# Patient Record
Sex: FEMALE | Race: WHITE | NOT HISPANIC OR LATINO | Employment: OTHER | URBAN - METROPOLITAN AREA
[De-identification: names, ages, dates, MRNs, and addresses within clinical notes are randomized per-mention and may not be internally consistent; named-entity substitution may affect disease eponyms.]

---

## 2023-02-08 ENCOUNTER — TELEPHONE (OUTPATIENT)
Dept: FAMILY MEDICINE | Facility: CLINIC | Age: 58
End: 2023-02-08
Payer: OTHER GOVERNMENT

## 2023-02-08 NOTE — TELEPHONE ENCOUNTER
----- Message from Brian Dominguez sent at 2/8/2023 11:28 AM CST -----  Contact: self  Type: RX Refill Request        Who Called: patient   Refill or New Rx: refill  RX Name and Strength: Pt forgot medication name.  Best Call Back Number: [phone]  Additional Information: Pt is new to [facility] states she is in pain from her medicine she needs refilled cant go to her old doctor anymore due to insurance change and now wants to become a new pt for Dr. Tavera. Pt needs medication refilled for pain she is in. Plz call to get scheduled. Pt states she wants to receive  paper scripts so she can refill the medication herself if Dr. Tavera does that or not. Thanks

## 2023-02-10 ENCOUNTER — OFFICE VISIT (OUTPATIENT)
Dept: FAMILY MEDICINE | Facility: CLINIC | Age: 58
End: 2023-02-10
Payer: OTHER GOVERNMENT

## 2023-02-10 VITALS
WEIGHT: 225.06 LBS | OXYGEN SATURATION: 98 % | BODY MASS INDEX: 33.34 KG/M2 | SYSTOLIC BLOOD PRESSURE: 160 MMHG | HEIGHT: 69 IN | HEART RATE: 82 BPM | RESPIRATION RATE: 18 BRPM | DIASTOLIC BLOOD PRESSURE: 98 MMHG

## 2023-02-10 DIAGNOSIS — Z01.89 ENCOUNTER FOR LABORATORY TEST: ICD-10-CM

## 2023-02-10 DIAGNOSIS — M79.674 PAIN OF TOE OF RIGHT FOOT: ICD-10-CM

## 2023-02-10 DIAGNOSIS — M10.9 ACUTE GOUT OF RIGHT FOOT, UNSPECIFIED CAUSE: Primary | ICD-10-CM

## 2023-02-10 DIAGNOSIS — Z76.89 ENCOUNTER TO ESTABLISH CARE WITH NEW DOCTOR: ICD-10-CM

## 2023-02-10 DIAGNOSIS — I10 UNCONTROLLED HYPERTENSION: ICD-10-CM

## 2023-02-10 DIAGNOSIS — M79.671 ARCH PAIN OF RIGHT FOOT: ICD-10-CM

## 2023-02-10 DIAGNOSIS — M79.671 RIGHT FOOT PAIN: ICD-10-CM

## 2023-02-10 DIAGNOSIS — E66.9 CLASS 1 OBESITY WITH BODY MASS INDEX (BMI) OF 33.0 TO 33.9 IN ADULT, UNSPECIFIED OBESITY TYPE, UNSPECIFIED WHETHER SERIOUS COMORBIDITY PRESENT: ICD-10-CM

## 2023-02-10 DIAGNOSIS — L84 FOOT CALLUS: ICD-10-CM

## 2023-02-10 DIAGNOSIS — M65.9 TENOSYNOVITIS OF RIGHT FOOT: ICD-10-CM

## 2023-02-10 PROCEDURE — 99204 PR OFFICE/OUTPT VISIT, NEW, LEVL IV, 45-59 MIN: ICD-10-PCS | Mod: S$PBB,,, | Performed by: INTERNAL MEDICINE

## 2023-02-10 PROCEDURE — 99214 OFFICE O/P EST MOD 30 MIN: CPT | Mod: PBBFAC,PN | Performed by: INTERNAL MEDICINE

## 2023-02-10 PROCEDURE — 99999 PR PBB SHADOW E&M-EST. PATIENT-LVL IV: CPT | Mod: PBBFAC,,, | Performed by: INTERNAL MEDICINE

## 2023-02-10 PROCEDURE — 99204 OFFICE O/P NEW MOD 45 MIN: CPT | Mod: S$PBB,,, | Performed by: INTERNAL MEDICINE

## 2023-02-10 PROCEDURE — 99999 PR PBB SHADOW E&M-EST. PATIENT-LVL IV: ICD-10-PCS | Mod: PBBFAC,,, | Performed by: INTERNAL MEDICINE

## 2023-02-10 RX ORDER — AMLODIPINE BESYLATE 5 MG/1
5 TABLET ORAL DAILY
Qty: 30 TABLET | Refills: 2 | Status: SHIPPED | OUTPATIENT
Start: 2023-02-10 | End: 2023-05-16

## 2023-02-10 RX ORDER — COLCHICINE 0.6 MG/1
TABLET ORAL
Qty: 30 TABLET | Refills: 2 | Status: SHIPPED | OUTPATIENT
Start: 2023-02-10 | End: 2023-02-24

## 2023-02-10 RX ORDER — AMLODIPINE BESYLATE 5 MG/1
5 TABLET ORAL DAILY
Qty: 30 TABLET | Refills: 2 | OUTPATIENT
Start: 2023-02-10 | End: 2023-02-10 | Stop reason: SDUPTHER

## 2023-02-10 RX ORDER — COLCHICINE 0.6 MG/1
TABLET ORAL
Qty: 30 TABLET | Refills: 2 | OUTPATIENT
Start: 2023-02-10 | End: 2023-02-10 | Stop reason: SDUPTHER

## 2023-02-10 NOTE — PROGRESS NOTES
Subjective:       Patient ID: Roxanne Aquino is a 57 y.o. female.      Patient here today to establish with me as her new PCP at [facility]. Past medical history and surgical history delineated and noted. Social medical history and family medical history also delineated and noted.  Review of systems obtained at length prior to physical exam being performed.  Medications reviewed as well and addressed.  Labs reviewed and ordered for follow-up as needed.      Chief Complaint: Establish Care and Foot Pain (Right foot, swollen, pt reports having a history of gout, here for medication)  HPI here to establish care with me as her new PCP at [facility].; would like me also manage acute gout flare up involving her right foot.  Patient has no prior history of hypertension noon and has been out of her [employer] a gene.  Patient works on her feet as a .  Foot Pain  Pertinent negatives include no abdominal pain, arthralgias, chest pain, congestion, coughing, fever, myalgias, nausea, rash or vomiting.   Acute gout of right foot, unspecified cause; needs to stay off her right leg and keep it elevated; Cold pack applications. Start colchicine asap. Follow BP at home and keep a log.  Cold packed applications to her right foot and try and keep it elevated at home and off her right foot as much as possible till resolved  -     colchicine (COLCRYS) 0.6 mg tablet; Take 1.2 mg (two tabs) at start of attack then may repeat 0.6 mg in 1 hr x1 dose if needed. Max 3 tabs per attack. 12 hrs later can begin 0.6 mg po daily. As prophylaxis. generic  Dispense: 30 tablet; Refill: 2  -     Sedimentation rate; Future; Expected date: 02/10/2023  -     Rheumatoid Factor; Future; Expected date: 02/10/2023  -     JOHN Screen w/Reflex; Future; Expected date: 02/10/2023  -     URIC ACID; Future; Expected date: 02/10/2023    Tenosynovitis of right foot; obtain padded sole inserts for foot. Tylenol arthritis for pain  as well.  Looser fitting shoes would also help  -     colchicine (COLCRYS) 0.6 mg tablet; Take 1.2 mg (two tabs) at start of attack then may repeat 0.6 mg in 1 hr x1 dose if needed. Max 3 tabs per attack. 12 hrs later can begin 0.6 mg po daily. As prophylaxis. generic  Dispense: 30 tablet; Refill: 2  -     Sedimentation rate; Future; Expected date: 02/10/2023  -     Rheumatoid Factor; Future; Expected date: 02/10/2023  -     JOHN Screen w/Reflex; Future; Expected date: 02/10/2023  -     URIC ACID; Future; Expected date: 02/10/2023    Arch pain of right foot; appears to have resolved.   -     colchicine (COLCRYS) 0.6 mg tablet; Take 1.2 mg (two tabs) at start of attack then may repeat 0.6 mg in 1 hr x1 dose if needed. Max 3 tabs per attack. 12 hrs later can begin 0.6 mg po daily. As prophylaxis. generic  Dispense: 30 tablet; Refill: 2  -     Sedimentation rate; Future; Expected date: 02/10/2023  -     Rheumatoid Factor; Future; Expected date: 02/10/2023  -     JOHN Screen w/Reflex; Future; Expected date: 02/10/2023  -     URIC ACID; Future; Expected date: 02/10/2023    Right foot pain; tylenol arthritis for pain relief.   -     colchicine (COLCRYS) 0.6 mg tablet; Take 1.2 mg (two tabs) at start of attack then may repeat 0.6 mg in 1 hr x1 dose if needed. Max 3 tabs per attack. 12 hrs later can begin 0.6 mg po daily. As prophylaxis. generic  Dispense: 30 tablet; Refill: 2  -     Sedimentation rate; Future; Expected date: 02/10/2023  -     Rheumatoid Factor; Future; Expected date: 02/10/2023  -     JOHN Screen w/Reflex; Future; Expected date: 02/10/2023  -     URIC ACID; Future; Expected date: 02/10/2023    Pain of toe of right foot; mostly 1st toe at MTP and bunion.   -     colchicine (COLCRYS) 0.6 mg tablet; Take 1.2 mg (two tabs) at start of attack then may repeat 0.6 mg in 1 hr x1 dose if needed. Max 3 tabs per attack. 12 hrs later can begin 0.6 mg po daily. As prophylaxis. generic  Dispense: 30 tablet; Refill: 2  -      Sedimentation rate; Future; Expected date: 02/10/2023  -     Rheumatoid Factor; Future; Expected date: 02/10/2023  -     JOHN Screen w/Reflex; Future; Expected date: 02/10/2023  -     URIC ACID; Future; Expected date: 02/10/2023    Right foot callus:  Use Aquaphor gel topical application 1-2 times daily as needed    Uncontrolled hypertension; Maintain < 1.5-2 Gm Na a day diet, and monitor BP at home; keep a log. Start amlodipine 5 mg a day as soon as possible. No caffeine. Drinks coffee all day; wean to decaff.   -     amLODIPine (NORVASC) 5 MG tablet; Take 1 tablet (5 mg total) by mouth once daily.  Dispense: 30 tablet; Refill: 2  -     Comprehensive Metabolic Panel; Future; Expected date: 02/10/2023  -     CBC Auto Differential; Future; Expected date: 02/10/2023  -     Lipid Panel; Future; Expected date: 02/10/2023  -     T4, Free; Future; Expected date: 02/10/2023  -     TSH; Future; Expected date: 02/10/2023    Obesity; Caloric restriction w regular exercise and weight reduction.  BMI 33.24    Encounter for lab test: Labs reviewed and discussed with patient and ordered for follow-up    Encounter to establish care with new doctor    Total time 1:45 p.m. through 2:32 p.m..  Greater than 50% of the time spent in discussion, counseling, and review.  Medications reviewed and addressed and prescribed labs reviewed and discussed with patient and ordered for follow-up.  Various different topics discussed including plan of care; discussed importance of obtaining a nursing visit for blood pressure assessment in 7-10 days.  Also monitor her blood pressure at home and keep a log for office review.  Patient to sit in chair during the morning with feet flat on the ground for good 5 minutes before running her blood pressure cuff.      Vitals:    02/10/23 1238 02/10/23 1258   BP: (!) 164/108 (!) 160/98   BP Location: Left arm    Patient Position: Sitting    Pulse: 82    Resp: 18    SpO2: 98%    Weight: 102.1 kg (225 lb 1.4 oz)   "  Height: 5' 9" (1.753 m)        BMI Readings from Last 3 Encounters:   02/10/23 33.24 kg/m²        Wt Readings from Last 3 Encounters:   02/10/23 1238 102.1 kg (225 lb 1.4 oz)        BP Readings from Last 3 Encounters:   02/10/23 (!) 160/98        There are no preventive care reminders to display for this patient.     Health Maintenance Due   Topic Date Due    Hepatitis C Screening  Never done    Cervical Cancer Screening  Never done    Lipid Panel  Never done    COVID-19 Vaccine (1) Never done    HIV Screening  Never done    TETANUS VACCINE  Never done    Mammogram  Never done    Hemoglobin A1c (Diabetic Prevention Screening)  Never done    Colorectal Cancer Screening  Never done    Shingles Vaccine (1 of 2) Never done    Influenza Vaccine (1) Never done         No past medical history on file.    No past surgical history on file.    Social History     Tobacco Use    Smoking status: Former     Types: Cigarettes     Quit date:      Years since quittin.1    Smokeless tobacco: Never       No family history on file.    Review of patient's allergies indicates:  No Known Allergies    No current outpatient medications on file prior to visit.     No current facility-administered medications on file prior to visit.     Review of Systems   Constitutional:  Negative for appetite change and fever.   HENT:  Negative for congestion, postnasal drip, rhinorrhea and sinus pressure.    Eyes:  Negative for discharge and itching.   Respiratory:  Negative for cough, chest tightness, shortness of breath and wheezing.    Cardiovascular:  Negative for chest pain, palpitations and leg swelling.   Gastrointestinal:  Negative for abdominal distention, abdominal pain, blood in stool, constipation, diarrhea, nausea and vomiting.   Endocrine: Negative for polydipsia, polyphagia and polyuria.   Genitourinary:  Negative for dysuria and hematuria.   Musculoskeletal:  Negative for arthralgias and myalgias.        Right foot pain for 1 " "week   Skin:  Negative for rash.   Allergic/Immunologic: Negative for environmental allergies and food allergies.   Neurological:  Negative for tremors, seizures and syncope.   Hematological:  Negative for adenopathy. Does not bruise/bleed easily.   Psychiatric/Behavioral:  Negative for dysphoric mood. The patient is not nervous/anxious.      Objective:      Vitals:    02/10/23 1238 02/10/23 1258   BP: (!) 164/108 (!) 160/98   BP Location: Left arm    Patient Position: Sitting    Pulse: 82    Resp: 18    SpO2: 98%    Weight: 102.1 kg (225 lb 1.4 oz)    Height: 5' 9" (1.753 m)      Body mass index is 33.24 kg/m².    Physical Exam  Vitals reviewed.   Constitutional:       Appearance: She is well-developed.   HENT:      Head: Normocephalic and atraumatic.   Neck:      Thyroid: No thyromegaly.      Vascular: No carotid bruit.   Cardiovascular:      Rate and Rhythm: Normal rate and regular rhythm.      Heart sounds: Normal heart sounds. No murmur heard.    No gallop.   Pulmonary:      Effort: Pulmonary effort is normal. No respiratory distress.      Breath sounds: Normal breath sounds. No wheezing or rales.   Abdominal:      General: Bowel sounds are normal. There is no distension.      Palpations: Abdomen is soft.      Tenderness: There is no abdominal tenderness. There is no guarding or rebound.   Musculoskeletal:         General: Normal range of motion.      Cervical back: Normal range of motion and neck supple.      Right lower leg: Edema present.      Left lower leg: No edema.      Comments: Right foot; early bunion tender to palp; medial 1st MT inflamed and 1st MTP and tender; a lot less tender to other MTPs;; ant ankle area inflamed w edema; edema also to dorsum of foot;, decrease 1st toe ROM. Heel callus and 1st plantar MTP as well. Edema of right ankle and foot. No calf tenderness to palp.    Lymphadenopathy:      Cervical: No cervical adenopathy.   Skin:     Findings: No rash.   Neurological:      Mental " Status: She is alert and oriented to person, place, and time.      Comments: Moves all 4 extremities fine.   Psychiatric:         Behavior: Behavior normal.         Thought Content: Thought content normal.       Assessment:       1. Acute gout of right foot, unspecified cause    2. Tenosynovitis of right foot    3. Arch pain of right foot    4. Right foot pain    5. Pain of toe of right foot    6. Foot callus    7. Uncontrolled hypertension    8. Class 1 obesity with body mass index (BMI) of 33.0 to 33.9 in adult, unspecified obesity type, unspecified whether serious comorbidity present    9. Encounter for laboratory test    10. Encounter to establish care with new doctor        Plan:       Acute gout of right foot, unspecified cause; needs to stay off her right leg and keep it elevated; Cold pack applications. Start colchicine asap. Follow BP at home and keep a log..  Elevate right leg at home and try and stay off her foot as much as possible till resolved.  Cold pack applications also can help; avoiding steroids due to uncontrolled hypertension and present with 160/98.  Blood pressure by me 199/90 patient asymptomatic without any neuro symptoms    -     colchicine (COLCRYS) 0.6 mg tablet; Take 1.2 mg (two tabs) at start of attack then may repeat 0.6 mg in 1 hr x1 dose if needed. Max 3 tabs per attack. 12 hrs later can begin 0.6 mg po daily. As prophylaxis. generic  Dispense: 30 tablet; Refill: 2  -     Sedimentation rate; Future; Expected date: 02/10/2023  -     Rheumatoid Factor; Future; Expected date: 02/10/2023  -     JOHN Screen w/Reflex; Future; Expected date: 02/10/2023  -     URIC ACID; Future; Expected date: 02/10/2023    Tenosynovitis of right foot; obtain padded sole inserts for foot. Tylenol arthritis for pain as well.  Looser fitting shoes would also help  -     colchicine (COLCRYS) 0.6 mg tablet; Take 1.2 mg (two tabs) at start of attack then may repeat 0.6 mg in 1 hr x1 dose if needed. Max 3 tabs per  attack. 12 hrs later can begin 0.6 mg po daily. As prophylaxis. generic  Dispense: 30 tablet; Refill: 2  -     Sedimentation rate; Future; Expected date: 02/10/2023  -     Rheumatoid Factor; Future; Expected date: 02/10/2023  -     JOHN Screen w/Reflex; Future; Expected date: 02/10/2023  -     URIC ACID; Future; Expected date: 02/10/2023    Arch pain of right foot; appears to have resolved.   -     colchicine (COLCRYS) 0.6 mg tablet; Take 1.2 mg (two tabs) at start of attack then may repeat 0.6 mg in 1 hr x1 dose if needed. Max 3 tabs per attack. 12 hrs later can begin 0.6 mg po daily. As prophylaxis. generic  Dispense: 30 tablet; Refill: 2  -     Sedimentation rate; Future; Expected date: 02/10/2023  -     Rheumatoid Factor; Future; Expected date: 02/10/2023  -     JOHN Screen w/Reflex; Future; Expected date: 02/10/2023  -     URIC ACID; Future; Expected date: 02/10/2023    Right foot pain; tylenol arthritis for pain relief.   -     colchicine (COLCRYS) 0.6 mg tablet; Take 1.2 mg (two tabs) at start of attack then may repeat 0.6 mg in 1 hr x1 dose if needed. Max 3 tabs per attack. 12 hrs later can begin 0.6 mg po daily. As prophylaxis. generic  Dispense: 30 tablet; Refill: 2  -     Sedimentation rate; Future; Expected date: 02/10/2023  -     Rheumatoid Factor; Future; Expected date: 02/10/2023  -     JOHN Screen w/Reflex; Future; Expected date: 02/10/2023  -     URIC ACID; Future; Expected date: 02/10/2023    Pain of toe of right foot; mostly 1st toe at MTP and bunion.   -     colchicine (COLCRYS) 0.6 mg tablet; Take 1.2 mg (two tabs) at start of attack then may repeat 0.6 mg in 1 hr x1 dose if needed. Max 3 tabs per attack. 12 hrs later can begin 0.6 mg po daily. As prophylaxis. generic  Dispense: 30 tablet; Refill: 2  -     Sedimentation rate; Future; Expected date: 02/10/2023  -     Rheumatoid Factor; Future; Expected date: 02/10/2023  -     JOHN Screen w/Reflex; Future; Expected date: 02/10/2023  -     URIC ACID;  Future; Expected date: 02/10/2023    Right foot callus:  Use Aquaphor gel topical application 1-2 times daily as needed    Uncontrolled hypertension; Maintain < 1.5-2 Gm Na a day diet, and monitor BP at home; keep a log. Start amlodipine 5 mg a day as soon as possible. No caffeine. Drinks coffee all day; wean to decaff.   -     amLODIPine (NORVASC) 5 MG tablet; Take 1 tablet (5 mg total) by mouth once daily.  Dispense: 30 tablet; Refill: 2  -     Comprehensive Metabolic Panel; Future; Expected date: 02/10/2023  -     CBC Auto Differential; Future; Expected date: 02/10/2023  -     Lipid Panel; Future; Expected date: 02/10/2023  -     T4, Free; Future; Expected date: 02/10/2023  -     TSH; Future; Expected date: 02/10/2023    Obesity; Caloric restriction w regular exercise and weight reduction.     Encounter for lab test: Labs reviewed and discussed with patient and ordered for follow-up    Encounter to establish care with new doctor

## 2023-02-10 NOTE — PATIENT INSTRUCTIONS
Acute gout of right foot, unspecified cause; needs to stay off her right leg and keep it elevated; Cold pack applications. Start colchicine asap. Follow BP at home and keep a log.   -     colchicine (COLCRYS) 0.6 mg tablet; Take 1.2 mg (two tabs) at start of attack then may repeat 0.6 mg in 1 hr x1 dose if needed. Max 3 tabs per attack. 12 hrs later can begin 0.6 mg po daily. As prophylaxis. generic  Dispense: 30 tablet; Refill: 2  -     Sedimentation rate; Future; Expected date: 02/10/2023  -     Rheumatoid Factor; Future; Expected date: 02/10/2023  -     JOHN Screen w/Reflex; Future; Expected date: 02/10/2023  -     URIC ACID; Future; Expected date: 02/10/2023    Tenosynovitis of right foot; obtain padded sole inserts for foot. Tylenol arthritis for pain as well.   -     colchicine (COLCRYS) 0.6 mg tablet; Take 1.2 mg (two tabs) at start of attack then may repeat 0.6 mg in 1 hr x1 dose if needed. Max 3 tabs per attack. 12 hrs later can begin 0.6 mg po daily. As prophylaxis. generic  Dispense: 30 tablet; Refill: 2  -     Sedimentation rate; Future; Expected date: 02/10/2023  -     Rheumatoid Factor; Future; Expected date: 02/10/2023  -     JOHN Screen w/Reflex; Future; Expected date: 02/10/2023  -     URIC ACID; Future; Expected date: 02/10/2023    Arch pain of right foot; appears to have resolved.   -     colchicine (COLCRYS) 0.6 mg tablet; Take 1.2 mg (two tabs) at start of attack then may repeat 0.6 mg in 1 hr x1 dose if needed. Max 3 tabs per attack. 12 hrs later can begin 0.6 mg po daily. As prophylaxis. generic  Dispense: 30 tablet; Refill: 2  -     Sedimentation rate; Future; Expected date: 02/10/2023  -     Rheumatoid Factor; Future; Expected date: 02/10/2023  -     JOHN Screen w/Reflex; Future; Expected date: 02/10/2023  -     URIC ACID; Future; Expected date: 02/10/2023    Right foot pain; tylenol arthritis for pain relief.   -     colchicine (COLCRYS) 0.6 mg tablet; Take 1.2 mg (two tabs) at start of attack  then may repeat 0.6 mg in 1 hr x1 dose if needed. Max 3 tabs per attack. 12 hrs later can begin 0.6 mg po daily. As prophylaxis. generic  Dispense: 30 tablet; Refill: 2  -     Sedimentation rate; Future; Expected date: 02/10/2023  -     Rheumatoid Factor; Future; Expected date: 02/10/2023  -     JOHN Screen w/Reflex; Future; Expected date: 02/10/2023  -     URIC ACID; Future; Expected date: 02/10/2023    Pain of toe of right foot; mostly 1st toe at MTP and bunion.   -     colchicine (COLCRYS) 0.6 mg tablet; Take 1.2 mg (two tabs) at start of attack then may repeat 0.6 mg in 1 hr x1 dose if needed. Max 3 tabs per attack. 12 hrs later can begin 0.6 mg po daily. As prophylaxis. generic  Dispense: 30 tablet; Refill: 2  -     Sedimentation rate; Future; Expected date: 02/10/2023  -     Rheumatoid Factor; Future; Expected date: 02/10/2023  -     JOHN Screen w/Reflex; Future; Expected date: 02/10/2023  -     URIC ACID; Future; Expected date: 02/10/2023    Uncontrolled hypertension; Maintain < 1.5-2 Gm Na a day diet, and monitor BP at home; keep a log. Start amlodipine 5 mg a day as soon as possible. No caffeine. Drinks coffee all day; wean to decaff.   -     amLODIPine (NORVASC) 5 MG tablet; Take 1 tablet (5 mg total) by mouth once daily.  Dispense: 30 tablet; Refill: 2  -     Comprehensive Metabolic Panel; Future; Expected date: 02/10/2023  -     CBC Auto Differential; Future; Expected date: 02/10/2023  -     Lipid Panel; Future; Expected date: 02/10/2023  -     T4, Free; Future; Expected date: 02/10/2023  -     TSH; Future; Expected date: 02/10/2023

## 2023-02-15 ENCOUNTER — PATIENT MESSAGE (OUTPATIENT)
Dept: ADMINISTRATIVE | Facility: HOSPITAL | Age: 58
End: 2023-02-15
Payer: OTHER GOVERNMENT

## 2023-02-15 DIAGNOSIS — Z12.31 OTHER SCREENING MAMMOGRAM: ICD-10-CM

## 2023-02-20 ENCOUNTER — PATIENT MESSAGE (OUTPATIENT)
Dept: ADMINISTRATIVE | Facility: HOSPITAL | Age: 58
End: 2023-02-20
Payer: OTHER GOVERNMENT

## 2023-02-21 ENCOUNTER — TELEPHONE (OUTPATIENT)
Dept: FAMILY MEDICINE | Facility: CLINIC | Age: 58
End: 2023-02-21
Payer: OTHER GOVERNMENT

## 2023-02-21 PROBLEM — M79.671 ARCH PAIN OF RIGHT FOOT: Status: ACTIVE | Noted: 2023-02-21

## 2023-02-21 PROBLEM — M10.9 ACUTE GOUT OF RIGHT FOOT: Status: ACTIVE | Noted: 2023-02-21

## 2023-02-21 PROBLEM — Z76.89 ENCOUNTER TO ESTABLISH CARE WITH NEW DOCTOR: Status: ACTIVE | Noted: 2023-02-21

## 2023-02-21 PROBLEM — E66.811 CLASS 1 OBESITY WITH BODY MASS INDEX (BMI) OF 33.0 TO 33.9 IN ADULT: Status: ACTIVE | Noted: 2023-02-21

## 2023-02-21 PROBLEM — Z01.89 ENCOUNTER FOR LABORATORY TEST: Status: ACTIVE | Noted: 2023-02-21

## 2023-02-21 PROBLEM — M79.674 PAIN OF TOE OF RIGHT FOOT: Status: ACTIVE | Noted: 2023-02-21

## 2023-02-21 PROBLEM — M65.9 TENOSYNOVITIS OF RIGHT FOOT: Status: ACTIVE | Noted: 2023-02-21

## 2023-02-21 PROBLEM — M79.671 RIGHT FOOT PAIN: Status: ACTIVE | Noted: 2023-02-21

## 2023-02-21 PROBLEM — E66.9 CLASS 1 OBESITY WITH BODY MASS INDEX (BMI) OF 33.0 TO 33.9 IN ADULT: Status: ACTIVE | Noted: 2023-02-21

## 2023-02-21 PROBLEM — I10 UNCONTROLLED HYPERTENSION: Status: ACTIVE | Noted: 2023-02-21

## 2023-02-21 PROBLEM — M65.971 TENOSYNOVITIS OF RIGHT FOOT: Status: ACTIVE | Noted: 2023-02-21

## 2023-02-21 PROBLEM — L84 FOOT CALLUS: Status: ACTIVE | Noted: 2023-02-21

## 2023-04-11 ENCOUNTER — PATIENT MESSAGE (OUTPATIENT)
Dept: ADMINISTRATIVE | Facility: HOSPITAL | Age: 58
End: 2023-04-11
Payer: OTHER GOVERNMENT

## 2023-05-16 DIAGNOSIS — I10 UNCONTROLLED HYPERTENSION: ICD-10-CM

## 2023-05-16 RX ORDER — AMLODIPINE BESYLATE 5 MG/1
TABLET ORAL
Qty: 90 TABLET | Refills: 2 | Status: SHIPPED | OUTPATIENT
Start: 2023-05-16 | End: 2024-02-20 | Stop reason: SDUPTHER

## 2023-05-16 NOTE — TELEPHONE ENCOUNTER
No care due was identified.  [facility] Embedded Care Due Messages. Reference number: [id number].   5/16/2023 11:08:25 AM CDT

## 2023-05-16 NOTE — TELEPHONE ENCOUNTER
Refill Decision Note   Roxanne Aquino  is requesting a refill authorization.  Brief Assessment and Rationale for Refill:  Approve     Medication Therapy Plan:         Comments:     Note composed:12:38 PM 05/16/2023

## 2023-10-04 ENCOUNTER — PATIENT MESSAGE (OUTPATIENT)
Dept: ADMINISTRATIVE | Facility: HOSPITAL | Age: 58
End: 2023-10-04
Payer: OTHER GOVERNMENT

## 2023-11-28 ENCOUNTER — TELEPHONE (OUTPATIENT)
Dept: FAMILY MEDICINE | Facility: CLINIC | Age: 58
End: 2023-11-28
Payer: OTHER GOVERNMENT

## 2023-11-28 ENCOUNTER — OFFICE VISIT (OUTPATIENT)
Dept: FAMILY MEDICINE | Facility: CLINIC | Age: 58
End: 2023-11-28
Payer: OTHER GOVERNMENT

## 2023-11-28 DIAGNOSIS — M10.9 ACUTE GOUT OF LEFT FOOT, UNSPECIFIED CAUSE: Primary | ICD-10-CM

## 2023-11-28 PROCEDURE — 99213 PR OFFICE/OUTPT VISIT, EST, LEVL III, 20-29 MIN: ICD-10-PCS | Mod: 95,,, | Performed by: PHYSICIAN ASSISTANT

## 2023-11-28 PROCEDURE — 99213 OFFICE O/P EST LOW 20 MIN: CPT | Mod: 95,,, | Performed by: PHYSICIAN ASSISTANT

## 2023-11-28 RX ORDER — COLCHICINE 0.6 MG/1
TABLET ORAL
Qty: 30 TABLET | Refills: 0 | Status: SHIPPED | OUTPATIENT
Start: 2023-11-28

## 2023-11-28 NOTE — TELEPHONE ENCOUNTER
----- Message from Holly Tolentino sent at 11/28/2023  9:30 AM CST -----  Contact: Self  Type: Needs Medical Advice  Who Called:  Patient  Symptoms (please be specific):  Gout flare-up  How long has patient had these symptoms:  3 days  Pharmacy name and phone #:    [id number] - [place] - [address]  [place] [address]  Phone: [phone] Fax: [phone]  Best Call Back Number: [phone]  Additional Information: Would like medication Colchicine called in.

## 2023-11-28 NOTE — PROGRESS NOTES
Subjective:      Patient ID: Roxanne Aquino is a 57 y.o. female.    Chief Complaint: Gout    Patient is new to me.    HPI  Patient has PMH of HTN and gout of right foot.    Patient reports left big toe pain with gout flare.  Has had this before. Denies injury or fall, fever.    Review of Systems   Constitutional:  Negative for activity change, chills, fever and unexpected weight change.   HENT:  Negative for hearing loss, rhinorrhea and trouble swallowing.    Eyes:  Negative for discharge and visual disturbance.   Respiratory:  Negative for chest tightness, shortness of breath and wheezing.    Cardiovascular:  Negative for chest pain and palpitations.   Gastrointestinal:  Negative for blood in stool, constipation, diarrhea and vomiting.   Endocrine: Negative for polydipsia and polyuria.   Genitourinary:  Negative for difficulty urinating, dysuria, hematuria and menstrual problem.   Musculoskeletal:  Positive for arthralgias and joint swelling. Negative for neck pain.   Neurological:  Negative for weakness and headaches.   Psychiatric/Behavioral:  Negative for confusion and dysphoric mood.        Objective:   There were no vitals taken for this visit.    Physical Exam  Constitutional:       Appearance: Normal appearance.   HENT:      Head: Normocephalic and atraumatic.   Pulmonary:      Effort: No respiratory distress.   Feet:      Comments: Patient states left big toe is painful and swollen.  Neurological:      Mental Status: She is alert and oriented to person, place, and time.   Psychiatric:         Mood and Affect: Mood normal.         Behavior: Behavior normal.         Judgment: Judgment normal.       Assessment:      1. Acute gout of left foot, unspecified cause       Plan:   1. Acute gout of left foot, unspecified cause  - colchicine (COLCRYS) 0.6 mg tablet; Take 1.2 mg (two tabs) at start of attack then may repeat 0.6 mg in 1 hr x1 dose if needed. Max 3 tabs per attack. 12 hrs later can begin 0.6 mg po  daily.  Dispense: 30 tablet; Refill: 0    Follow up in 3 months with new PCP.  The patient location is:  Patient Home   The chief complaint leading to consultation is: gout  Visit type: Virtual visit with synchronous audio and video  Total time spent with patient: 14 minutes  Each patient to whom he or she provides medical services by telemedicine is:  (1) informed of the relationship between the physician and patient and the respective role of any other health care provider with respect to management of the patient; and (2) notified that he or she may decline to receive medical services by telemedicine and may withdraw from such care at any time.

## 2023-11-28 NOTE — TELEPHONE ENCOUNTER
Tried to reach pt. No answer. Left message for pt to call back. Per previous msg, we attempted to call back and was unable to leave msg.

## 2023-11-28 NOTE — Clinical Note
Patient needs new PCP and looks like Dr. Amanda was scheduled inappropriately.  Can y'all help fix this?  Thanks!

## 2023-11-28 NOTE — TELEPHONE ENCOUNTER
----- Message from Michael Mullins sent at 11/28/2023  1:02 PM CST -----  Type:  Patient Returning Call    Who Called:  pt   Who Left Message for Patient:  Radha   Does the patient know what this is regarding?:  yes  Best Call Back Number:  [phone]    Additional Information:  pt called to check status of message sent and said she has not heard back from anyone and asking to be advised asap please call pt back asap thanks!

## 2024-02-06 ENCOUNTER — PATIENT OUTREACH (OUTPATIENT)
Dept: ADMINISTRATIVE | Facility: HOSPITAL | Age: 59
End: 2024-02-06
Payer: OTHER GOVERNMENT

## 2024-02-06 ENCOUNTER — PATIENT MESSAGE (OUTPATIENT)
Dept: ADMINISTRATIVE | Facility: HOSPITAL | Age: 59
End: 2024-02-06
Payer: OTHER GOVERNMENT

## 2024-02-06 NOTE — PROGRESS NOTES
Population Health Chart Review & Patient Outreach Details    Outreach Performed: YES Portal    Additional [facility] Notes:           Updates Requested / Reviewed:      Updated Care Coordination Note, Care Everywhere, and Immunizations Reconciliation Completed or Queried: [place]         Health Maintenance Topics Overdue:    Health Maintenance Due   Topic Date Due    Hepatitis C Screening  Never done    Cervical Cancer Screening  Never done    Lipid Panel  Never done    COVID-19 Vaccine (1) Never done    HIV Screening  Never done    TETANUS VACCINE  Never done    Mammogram  Never done    Hemoglobin A1c (Diabetic Prevention Screening)  Never done    Colorectal Cancer Screening  Never done    Shingles Vaccine (1 of 2) Never done    Influenza Vaccine (1) Never done    RSV Vaccine (Age 60+ and Pregnant patients) (1 - 1-dose 60+ series) Never done         Health Maintenance Topic(s) Outreach Outcomes & Actions Taken:    Primary Care Appt - Outreach Outcomes & Actions Taken  : The patient was sent a portal message about est care with a new PCP.

## 2024-02-20 ENCOUNTER — TELEPHONE (OUTPATIENT)
Dept: FAMILY MEDICINE | Facility: CLINIC | Age: 59
End: 2024-02-20
Payer: OTHER GOVERNMENT

## 2024-02-20 DIAGNOSIS — I10 UNCONTROLLED HYPERTENSION: ICD-10-CM

## 2024-02-20 RX ORDER — AMLODIPINE BESYLATE 5 MG/1
5 TABLET ORAL DAILY
Qty: 90 TABLET | Refills: 0 | Status: SHIPPED | OUTPATIENT
Start: 2024-02-20 | End: 2024-05-15 | Stop reason: SDUPTHER

## 2024-02-20 NOTE — TELEPHONE ENCOUNTER
----- Message from Brian Dominguez sent at 2/20/2024  1:28 PM CST -----  Contact: self  Type: RX Refill Request        Who Called: Patient   Refill or New Rx: refill  RX Name and Strength: amLODIPine (NORVASC) 5 MG tablet  How is the patient currently taking it? (ex. 1XDay): as directed   Is this a 30 day or 90 day RX: 90 day   Preferred Pharmacy with phone number:   [id number] - [place], [place] - [address] [address]  Phone: [phone] Fax: [phone]  Local or Mail Order: local   Ordering Provider: Dr. Amanda   [facility] Call Back Number: [phone]  Additional Information: Pt is completley out.If pt needs new script plz send in one.  Plz call pt before sending doesn't want to make another blank tot he pharmacy. Thanks

## 2024-05-15 DIAGNOSIS — I10 UNCONTROLLED HYPERTENSION: ICD-10-CM

## 2024-05-15 RX ORDER — AMLODIPINE BESYLATE 5 MG/1
5 TABLET ORAL DAILY
Qty: 90 TABLET | Refills: 0 | Status: SHIPPED | OUTPATIENT
Start: 2024-05-15

## 2024-06-19 ENCOUNTER — OFFICE VISIT (OUTPATIENT)
Dept: FAMILY MEDICINE | Facility: CLINIC | Age: 59
End: 2024-06-19
Payer: OTHER GOVERNMENT

## 2024-06-19 VITALS
WEIGHT: 219.13 LBS | HEART RATE: 86 BPM | OXYGEN SATURATION: 96 % | SYSTOLIC BLOOD PRESSURE: 140 MMHG | BODY MASS INDEX: 32.46 KG/M2 | DIASTOLIC BLOOD PRESSURE: 80 MMHG | HEIGHT: 69 IN

## 2024-06-19 DIAGNOSIS — L03.115 CELLULITIS OF RIGHT ANKLE: Primary | ICD-10-CM

## 2024-06-19 DIAGNOSIS — M10.9 ACUTE GOUT OF RIGHT ANKLE, UNSPECIFIED CAUSE: ICD-10-CM

## 2024-06-19 DIAGNOSIS — Z12.31 ENCOUNTER FOR SCREENING MAMMOGRAM FOR MALIGNANT NEOPLASM OF BREAST: ICD-10-CM

## 2024-06-19 PROCEDURE — 99214 OFFICE O/P EST MOD 30 MIN: CPT | Mod: PBBFAC,PO | Performed by: PHYSICIAN ASSISTANT

## 2024-06-19 PROCEDURE — 99214 OFFICE O/P EST MOD 30 MIN: CPT | Mod: S$PBB,,, | Performed by: PHYSICIAN ASSISTANT

## 2024-06-19 PROCEDURE — 99999 PR PBB SHADOW E&M-EST. PATIENT-LVL IV: CPT | Mod: PBBFAC,,, | Performed by: PHYSICIAN ASSISTANT

## 2024-06-19 RX ORDER — DOXYCYCLINE 100 MG/1
100 CAPSULE ORAL 2 TIMES DAILY
Qty: 14 CAPSULE | Refills: 0 | Status: SHIPPED | OUTPATIENT
Start: 2024-06-19 | End: 2024-06-26

## 2024-06-19 RX ORDER — PREDNISONE 20 MG/1
20 TABLET ORAL DAILY
Qty: 5 TABLET | Refills: 0 | Status: SHIPPED | OUTPATIENT
Start: 2024-06-19 | End: 2024-06-24

## 2024-06-19 NOTE — PROGRESS NOTES
"Subjective:      Patient ID: Roxanne Aquino is a 58 y.o. female.    Chief Complaint: Ankle Pain    HPI  Patient has PMH of HTN and gout of right foot.  Treated patient for gout in left big toe 11/28/2023.    Patient reports right ankle swelling and pain for 5 days.  Took colchicine two days ago without relief.  No injury or fall noted.  Does have an ant bite on right ankle from a month ago.  Some pruritus in this area.    Faints when blood is drawn.    Review of Systems   Constitutional:  Negative for appetite change, chills and fever.   Respiratory:  Negative for shortness of breath.    Cardiovascular:  Negative for chest pain.   Musculoskeletal:  Positive for joint swelling (right ankle).       Objective:   BP (!) 140/80   Pulse 86   Ht 5' 9" (1.753 m)   Wt 99.4 kg (219 lb 2.2 oz)   SpO2 96%   BMI 32.36 kg/m²     Physical Exam  Vitals reviewed.   Constitutional:       Appearance: Normal appearance. She is well-developed.   HENT:      Head: Normocephalic and atraumatic.      Right Ear: External ear normal.      Left Ear: External ear normal.   Eyes:      Conjunctiva/sclera: Conjunctivae normal.   Cardiovascular:      Rate and Rhythm: Normal rate and regular rhythm.      Heart sounds: Normal heart sounds. No murmur heard.     No friction rub. No gallop.   Pulmonary:      Effort: Pulmonary effort is normal. No respiratory distress.      Breath sounds: Normal breath sounds. No wheezing, rhonchi or rales.   Musculoskeletal:         General: Normal range of motion.   Skin:     General: Skin is warm and dry.      Findings: No rash.      Comments: Right ankle swelling and erythema with healing bite of lateral ankle.   Neurological:      General: No focal deficit present.      Mental Status: She is alert and oriented to person, place, and time.   Psychiatric:         Mood and Affect: Mood normal.         Behavior: Behavior normal.         Judgment: Judgment normal.       Assessment:      1. Cellulitis of right " ankle    2. Acute gout of right ankle, unspecified cause    3. Encounter for screening mammogram for malignant neoplasm of breast       Plan:   1. Cellulitis of right ankle  Gave strict ER precautions for worsening symptoms.  Possibly another gout flare, but seems severe.  Would like bloodwork, but patient declines.  - doxycycline (MONODOX) 100 MG capsule; Take 1 capsule (100 mg total) by mouth 2 (two) times daily. for 7 days  Dispense: 14 capsule; Refill: 0    2. Acute gout of right ankle, unspecified cause  - predniSONE (DELTASONE) 20 MG tablet; Take 1 tablet (20 mg total) by mouth once daily. for 5 days  Dispense: 5 tablet; Refill: 0    3. Encounter for screening mammogram for malignant neoplasm of breast  - Mammo Digital Screening Bilat w/ [facility]; Future    Follow up in three months with a new PCP.  Patient agreed with plan and expressed understanding.  I spent 30 minutes on this encounter, time includes face-to-face, chart review, documentation, test review and orders.    Thank you for allowing me to serve you,

## 2024-06-26 ENCOUNTER — NURSE TRIAGE (OUTPATIENT)
Dept: ADMINISTRATIVE | Facility: CLINIC | Age: 59
End: 2024-06-26
Payer: OTHER GOVERNMENT

## 2024-06-26 ENCOUNTER — PATIENT MESSAGE (OUTPATIENT)
Dept: URGENT CARE | Facility: CLINIC | Age: 59
End: 2024-06-26
Payer: OTHER GOVERNMENT

## 2024-06-26 NOTE — TELEPHONE ENCOUNTER
Pt dx with cellulitis/gout of right ankle and was prescribed antibiotic and steroid. Pt is on the last day of antibiotic today and pain symptoms are returning. Pt reports that symptoms were improving and pain was better with the steroids but pain started returning after steroids complete. Swelling and redness is improved.     Dispo is to see in office today. No appts available. OD VV offered and accepted and patient assisted into queue. Care advice given. Pt v/u.        Reason for Disposition   Taking antibiotic > 24 hours and cellulitis symptoms are WORSE (e.g., spreading redness, pain, swelling)    Additional Information   Negative: Shock suspected (e.g., cold/pale/clammy skin, too weak to stand, low BP, rapid pulse)   Negative: Sounds like a life-threatening emergency to the triager   Negative: SEVERE pain   Negative: SEVERE pain with bending of finger (or toe) and cellulitis on hand (or foot)   Negative: Widespread rash and bright red, sunburn-like   Negative: Fever > 103 F (39.4 C)   Negative: Black (necrotic), dark purple, or blisters develop in area of cellulitis   Negative: Patient sounds very sick or weak to the triager   Negative: Taking antibiotic > 24 hours and fever persists   Negative: Taking antibiotic > 24 hours and red streak (or line) runs from area of infection   Negative: Fever > 100 F (37.8 C) and new-onset   Negative: Red streak runs from area of infection and new-onset   Negative: Caller has URGENT question and triager unable to answer question   Negative: Entire foot is cool or blue in comparison to other side   Negative: Fever and red area (or area very tender to touch)   Negative: Swollen joint and fever   Negative: Patient sounds very sick or weak to the triager   Negative: SEVERE pain (e.g., excruciating, unable to walk) and not improved after 2 hours of pain medicine   Negative: Thigh or calf pain and only 1 side and present > 1 hour   Negative: Thigh or calf swelling and only 1 side    Negative: Looks infected (spreading redness, pus) and large red area (> 2 in. or 5 cm)   Negative: Looks like a boil, infected sore, deep ulcer, or other infected rash (spreading redness, pus)   Negative: Redness of the skin and no fever    Protocols used: Cellulitis on Antibiotic Follow-up Call-A-[place], Ankle Pain-A-[place]

## 2024-06-27 ENCOUNTER — HOSPITAL ENCOUNTER (OUTPATIENT)
Dept: RADIOLOGY | Facility: HOSPITAL | Age: 59
Discharge: HOME | End: 2024-06-27
Attending: FAMILY MEDICINE
Payer: OTHER GOVERNMENT

## 2024-06-27 ENCOUNTER — OFFICE VISIT (OUTPATIENT)
Dept: FAMILY MEDICINE | Facility: CLINIC | Age: 59
End: 2024-06-27
Payer: OTHER GOVERNMENT

## 2024-06-27 VITALS
DIASTOLIC BLOOD PRESSURE: 90 MMHG | OXYGEN SATURATION: 95 % | WEIGHT: 218.06 LBS | HEIGHT: 69 IN | HEART RATE: 96 BPM | SYSTOLIC BLOOD PRESSURE: 156 MMHG | BODY MASS INDEX: 32.3 KG/M2

## 2024-06-27 DIAGNOSIS — I10 UNCONTROLLED HYPERTENSION: Primary | ICD-10-CM

## 2024-06-27 DIAGNOSIS — R60.0 LOCALIZED EDEMA: ICD-10-CM

## 2024-06-27 DIAGNOSIS — L03.031 CELLULITIS OF TOE OF RIGHT FOOT: ICD-10-CM

## 2024-06-27 DIAGNOSIS — Z00.00 WELLNESS EXAMINATION: ICD-10-CM

## 2024-06-27 PROCEDURE — 73630 X-RAY EXAM OF FOOT: CPT | Mod: 26,RT,, | Performed by: RADIOLOGY

## 2024-06-27 PROCEDURE — 99999 PR PBB SHADOW E&M-EST. PATIENT-LVL IV: CPT | Mod: PBBFAC,,, | Performed by: FAMILY MEDICINE

## 2024-06-27 PROCEDURE — 99999PBSHW PR PBB SHADOW TECHNICAL ONLY FILED TO HB: Mod: PBBFAC,,,

## 2024-06-27 PROCEDURE — 73630 X-RAY EXAM OF FOOT: CPT | Mod: TC,FY,PO,RT

## 2024-06-27 PROCEDURE — 99214 OFFICE O/P EST MOD 30 MIN: CPT | Mod: PBBFAC,PO | Performed by: FAMILY MEDICINE

## 2024-06-27 RX ORDER — CEPHALEXIN 500 MG/1
500 CAPSULE ORAL 3 TIMES DAILY
Qty: 30 CAPSULE | Refills: 0 | Status: SHIPPED | OUTPATIENT
Start: 2024-06-27

## 2024-06-27 RX ORDER — CEFTRIAXONE 1 G/1
1 INJECTION, POWDER, FOR SOLUTION INTRAMUSCULAR; INTRAVENOUS
Status: COMPLETED | OUTPATIENT
Start: 2024-06-27 | End: 2024-06-27

## 2024-06-27 RX ORDER — LOSARTAN POTASSIUM AND HYDROCHLOROTHIAZIDE 12.5; 5 MG/1; MG/1
1 TABLET ORAL DAILY
Qty: 90 TABLET | Refills: 3 | Status: SHIPPED | OUTPATIENT
Start: 2024-06-27 | End: 2025-06-27

## 2024-06-27 RX ADMIN — CEFTRIAXONE 1 G: 1 INJECTION, POWDER, FOR SOLUTION INTRAMUSCULAR; INTRAVENOUS at 04:06

## 2024-06-27 NOTE — PATIENT INSTRUCTIONS
Ask your pharmacist about the tetanus vaccine Tdap, shingles vaccine, completion of the COVID-19 vaccine series if you choose, and RSV vaccine.

## 2024-06-27 NOTE — PROGRESS NOTES
"Subjective:       Patient ID: Roxanne Aquino is a 58 y.o. female.    Chief Complaint: Foot Swelling (Ongoing since 6/19, right ankle swollen. Taken medications for gout and has not given relief)    HPI:  Pleasant 58-year-old patient is here today with some swelling of the right lower extremity.  She has been by a bug about a month ago she believes there is a small scab there in the neck cleared up with a slight whole therefore time.  She was seen recently and treated with a steroid and an oral antibiotic doxycycline.  She just finished the doxycycline.  When she was done with a steroid the swelling returned to her her foot and lower leg area.  Seems to be some erythema there it has not extremely warm to touch.  I do not feel any fluctuance.  I am concerned that is there might be developing cellulitis.  Also I would like to rule out DVT with ultrasound.  We are going to use ceftriaxone followed by Keflex and see how this goes.  She does have gout but perhaps once a year or less.    Review of Systems   Constitutional: Negative.    HENT: Negative.     Eyes: Negative.    Respiratory: Negative.     Cardiovascular: Negative.    Gastrointestinal: Negative.    Endocrine: Negative.    Genitourinary: Negative.    Musculoskeletal: Negative.    Skin: Negative.    Allergic/Immunologic: Negative.    Neurological: Negative.    Hematological: Negative.    Psychiatric/Behavioral: Negative.         Objective:      Vitals:    06/27/24 1531   BP: (!) 156/90   Pulse: 96   SpO2: 95%   Weight: 98.9 kg (218 lb 0.6 oz)   Height: 5' 9" (1.753 m)      Physical Exam  Vitals and nursing note reviewed.   Constitutional:       Appearance: Normal appearance. She is normal weight.   HENT:      Head: Normocephalic and atraumatic.      Nose: Nose normal.      Mouth/Throat:      Mouth: Mucous membranes are moist.      Pharynx: Oropharynx is clear.   Eyes:      Extraocular Movements: Extraocular movements intact.      Conjunctiva/sclera: " Conjunctivae normal.      Pupils: Pupils are equal, round, and reactive to light.   Cardiovascular:      Rate and Rhythm: Normal rate and regular rhythm.      Pulses: Normal pulses.      Heart sounds: Normal heart sounds.   Pulmonary:      Effort: Pulmonary effort is normal.      Breath sounds: Normal breath sounds.   Abdominal:      General: Abdomen is flat. Bowel sounds are normal.      Palpations: Abdomen is soft.   Musculoskeletal:         General: Normal range of motion.      Cervical back: Normal range of motion and neck supple.   Skin:     General: Skin is warm and dry.      Capillary Refill: Capillary refill takes less than 2 seconds.   Neurological:      General: No focal deficit present.      Mental Status: She is alert and oriented to person, place, and time. Mental status is at baseline.   Psychiatric:         Mood and Affect: Mood normal.         Behavior: Behavior normal.         Thought Content: Thought content normal.         Judgment: Judgment normal.         Results for orders placed or performed in visit on 05/10/19   Uric acid   Result Value Ref Range    Uric Acid 7.8 (H) 2.4 - 5.7 mg/dL      Assessment:       1. Uncontrolled hypertension    2. Cellulitis of toe of right foot    3. Localized edema    4. Wellness examination        Plan:       Uncontrolled hypertension  -     losartan-hydrochlorothiazide 50-12.5 mg (HYZAAR) 50-12.5 mg per tablet; Take 1 tablet by mouth once daily.  Dispense: 90 tablet; Refill: 3  -     Comprehensive Metabolic Panel; Future; Expected date: 06/27/2024    Cellulitis of toe of right foot  -     X-Ray Foot Complete Right; Future; Expected date: 06/27/2024  -     cefTRIAXone injection 1 g  -     cephALEXin (KEFLEX) 500 MG capsule; Take 1 capsule (500 mg total) by mouth 3 (three) times daily.  Dispense: 30 capsule; Refill: 0  -     Hemoglobin A1C; Future; Expected date: 06/27/2024  -     CBC Auto Differential; Future; Expected date: 06/27/2024    Localized edema  -      US Lower Extremity Veins Right; Future; Expected date: 06/27/2024  -     D-Dimer, Quantitative; Future; Expected date: 06/27/2024    Wellness examination  -     CBC Auto Differential; Future; Expected date: 06/27/2024  -     Lipid Panel; Future; Expected date: 06/27/2024  -     Ambulatory referral/consult to Gynecology; Future; Expected date: 07/04/2024  -     Hepatitis C Antibody; Future; Expected date: 06/27/2024  -     HIV 1/2 Ag/Ab (4th Gen); Future; Expected date: 06/27/2024  -     Fecal Immunochemical Test (iFOBT); Future; Expected date: 06/27/2024          Medication List with Changes/Refills   New Medications    CEPHALEXIN (KEFLEX) 500 MG CAPSULE    Take 1 capsule (500 mg total) by mouth 3 (three) times daily.    LOSARTAN-HYDROCHLOROTHIAZIDE 50-12.5 MG (HYZAAR) 50-12.5 MG PER TABLET    Take 1 tablet by mouth once daily.   Current Medications    AMLODIPINE (NORVASC) 5 MG TABLET    Take 1 tablet (5 mg total) by mouth once daily.    COLCHICINE (COLCRYS) 0.6 MG TABLET    Take 1.2 mg (two tabs) at start of attack then may repeat 0.6 mg in 1 hr x1 dose if needed. Max 3 tabs per attack. 12 hrs later can begin 0.6 mg po daily.   Discontinued Medications    DOXYCYCLINE (MONODOX) 100 MG CAPSULE    Take 1 capsule (100 mg total) by mouth 2 (two) times daily. for 7 days

## 2024-06-28 ENCOUNTER — TELEPHONE (OUTPATIENT)
Dept: FAMILY MEDICINE | Facility: CLINIC | Age: 59
End: 2024-06-28
Payer: OTHER GOVERNMENT

## 2024-06-28 DIAGNOSIS — M10.9 ACUTE GOUT, UNSPECIFIED CAUSE, UNSPECIFIED SITE: Primary | ICD-10-CM

## 2024-06-28 RX ORDER — NAPROXEN 500 MG/1
500 TABLET ORAL 2 TIMES DAILY
Qty: 14 TABLET | Refills: 0 | Status: SHIPPED | OUTPATIENT
Start: 2024-06-28 | End: 2024-07-05

## 2024-06-30 ENCOUNTER — PATIENT MESSAGE (OUTPATIENT)
Dept: FAMILY MEDICINE | Facility: CLINIC | Age: 59
End: 2024-06-30
Payer: OTHER GOVERNMENT

## 2024-06-30 DIAGNOSIS — E78.2 MIXED HYPERLIPIDEMIA: Primary | ICD-10-CM

## 2024-06-30 RX ORDER — ROSUVASTATIN CALCIUM 10 MG/1
10 TABLET, COATED ORAL DAILY
Qty: 90 TABLET | Refills: 3 | Status: SHIPPED | OUTPATIENT
Start: 2024-06-30 | End: 2025-06-30

## 2024-07-01 ENCOUNTER — TELEPHONE (OUTPATIENT)
Dept: FAMILY MEDICINE | Facility: CLINIC | Age: 59
End: 2024-07-01
Payer: OTHER GOVERNMENT

## 2024-07-01 NOTE — TELEPHONE ENCOUNTER
----- Message from Joseph Galindo MD sent at 6/30/2024  4:13 PM CDT -----  Please ask the patient when she is out of pain for least a week and then I would like to start her on medicine for her uric acid level and more than likely gout that she has been having there.  The x-ray suggests gout.  Thank you.

## 2024-07-02 ENCOUNTER — HOSPITAL ENCOUNTER (OUTPATIENT)
Dept: RADIOLOGY | Facility: HOSPITAL | Age: 59
Discharge: HOME | End: 2024-07-02
Attending: FAMILY MEDICINE
Payer: OTHER GOVERNMENT

## 2024-07-02 DIAGNOSIS — R60.0 LOCALIZED EDEMA: ICD-10-CM

## 2024-07-02 PROCEDURE — 93971 EXTREMITY STUDY: CPT | Mod: 26,RT,, | Performed by: RADIOLOGY

## 2024-07-02 PROCEDURE — 93971 EXTREMITY STUDY: CPT | Mod: TC,PO,RT

## 2024-07-05 ENCOUNTER — OFFICE VISIT (OUTPATIENT)
Dept: FAMILY MEDICINE | Facility: CLINIC | Age: 59
End: 2024-07-05
Payer: OTHER GOVERNMENT

## 2024-07-05 VITALS
OXYGEN SATURATION: 95 % | DIASTOLIC BLOOD PRESSURE: 60 MMHG | TEMPERATURE: 98 F | SYSTOLIC BLOOD PRESSURE: 130 MMHG | BODY MASS INDEX: 32.26 KG/M2 | HEIGHT: 69 IN | HEART RATE: 86 BPM | RESPIRATION RATE: 18 BRPM | WEIGHT: 217.81 LBS

## 2024-07-05 DIAGNOSIS — E78.2 MIXED HYPERLIPIDEMIA: ICD-10-CM

## 2024-07-05 DIAGNOSIS — L03.115 CELLULITIS OF RIGHT LOWER EXTREMITY: Primary | ICD-10-CM

## 2024-07-05 PROCEDURE — 99214 OFFICE O/P EST MOD 30 MIN: CPT | Mod: PBBFAC,PO | Performed by: NURSE PRACTITIONER

## 2024-07-05 PROCEDURE — 99999 PR PBB SHADOW E&M-EST. PATIENT-LVL IV: CPT | Mod: PBBFAC,,, | Performed by: NURSE PRACTITIONER

## 2024-07-05 NOTE — PROGRESS NOTES
Subjective:       Patient ID: Roxanne Aqunio is a 58 y.o. female.    Chief Complaint: Ankle swelling follow up and Hypertension (Follow up)    HPI follow up for cellulitis to right lower leg. Saw Dr Galindo on 24 and started on keflex. Reports symptoms are almost resolved. Still taking keflex. Denies fever and states she feels much better. US of right leg was normal.    Pt was prescribed crestor for hyperlipidemia last visit. Pt is afraid of taking the crestor and would like to try lifestyle modifications first.    No past medical history on file.    No past surgical history on file.    Review of patient's allergies indicates:   Allergen Reactions    Bee pollens Anaphylaxis, Hives, Itching, Palpitations, Rash, Shortness Of Breath and Swelling       Social History     Socioeconomic History    Marital status:    Tobacco Use    Smoking status: Former     Current packs/day: 0.00     Types: Cigarettes     Quit date:      Years since quittin.5    Smokeless tobacco: Never     Social Determinants of Health     Financial Resource Strain: Patient Declined (2023)    Overall Financial Resource Strain (CARDIA)     Difficulty of Paying Living Expenses: Patient declined   Food Insecurity: Patient Declined (2023)    Hunger Vital Sign     Worried About Running Out of Food in the Last Year: Patient declined     Ran Out of Food in the Last Year: Patient declined   Transportation Needs: Unknown (2023)    PRAPARE - Transportation     Lack of Transportation (Medical): No     Lack of Transportation (Non-Medical): Patient declined   Physical Activity: Sufficiently Active (2023)    Exercise Vital Sign     Days of Exercise per Week: 6 days     Minutes of Exercise per Session: 30 min   Stress: No Stress Concern Present (2023)    [employer] - Occupational Stress Questionnaire     Feeling of Stress : Only a little   Housing Stability: Unknown (2023)     "Housing Stability Vital Sign     Unable to Pay for Housing in the Last Year: Patient refused     Unstable Housing in the Last Year: Patient refused       Current Outpatient Medications on File Prior to Visit   Medication Sig Dispense Refill    amLODIPine (NORVASC) 5 MG tablet Take 1 tablet (5 mg total) by mouth once daily. 90 tablet 0    cephALEXin (KEFLEX) 500 MG capsule Take 1 capsule (500 mg total) by mouth 3 (three) times daily. 30 capsule 0    losartan-hydrochlorothiazide 50-12.5 mg (HYZAAR) 50-12.5 mg per tablet Take 1 tablet by mouth once daily. 90 tablet 3    naproxen (NAPROSYN) 500 MG tablet Take 1 tablet (500 mg total) by mouth 2 (two) times daily. for 7 days 14 tablet 0    rosuvastatin (CRESTOR) 10 MG tablet Take 1 tablet (10 mg total) by mouth once daily. 90 tablet 3    [DISCONTINUED] colchicine (COLCRYS) 0.6 mg tablet Take 1.2 mg (two tabs) at start of attack then may repeat 0.6 mg in 1 hr x1 dose if needed. Max 3 tabs per attack. 12 hrs later can begin 0.6 mg po daily. 30 tablet 0    [DISCONTINUED] allopurinoL (ZYLOPRIM) 300 MG tablet Take 1 tablet (300 mg total) by mouth once daily. (Patient not taking: Reported on 7/5/2024) 90 tablet 3     No current facility-administered medications on file prior to visit.       No family history on file.    Review of Systems   Constitutional: Negative.    HENT: Negative.     Eyes: Negative.    Respiratory: Negative.     Cardiovascular: Negative.    Gastrointestinal: Negative.    Endocrine: Negative.    Genitourinary: Negative.    Musculoskeletal: Negative.    Skin: Negative.    Neurological: Negative.    Psychiatric/Behavioral: Negative.         Objective:      /60   Pulse 86   Temp 98.4 °F (36.9 °C) (Oral)   Resp 18   Ht 5' 9" (1.753 m)   Wt 98.8 kg (217 lb 13 oz)   SpO2 95%   BMI 32.17 kg/m²   Physical Exam  Vitals and nursing note reviewed.   Constitutional:       General: She is not in acute distress.     Appearance: Normal appearance. She is " well-groomed.   HENT:      Head: Normocephalic.      Right Ear: External ear normal.      Left Ear: External ear normal.      Nose: Nose normal.      Mouth/Throat:      Lips: Pink.      Mouth: Mucous membranes are moist.      Pharynx: Oropharynx is clear.   Eyes:      Extraocular Movements: Extraocular movements intact.      Conjunctiva/sclera: Conjunctivae normal.      Pupils: Pupils are equal, round, and reactive to light.   Cardiovascular:      Rate and Rhythm: Normal rate and regular rhythm.      Heart sounds: Normal heart sounds. No murmur heard.  Pulmonary:      Effort: Pulmonary effort is normal.      Breath sounds: Normal breath sounds.   Chest:      Chest wall: No tenderness.   Abdominal:      General: Bowel sounds are normal.      Palpations: Abdomen is soft.      Tenderness: There is no abdominal tenderness.   Musculoskeletal:         General: No swelling or tenderness. Normal range of motion.      Cervical back: Normal range of motion and neck supple.      Right lower leg: No edema.      Left lower leg: No edema.   Lymphadenopathy:      Cervical: No cervical adenopathy.   Skin:     General: Skin is warm and dry.      Findings: No erythema.   Neurological:      General: No focal deficit present.      Mental Status: She is alert and oriented to person, place, and time. Mental status is at baseline.      Gait: Gait is intact.   Psychiatric:         Mood and Affect: Mood normal.         Behavior: Behavior normal.         Assessment:       1. Cellulitis of right lower extremity    2. Mixed hyperlipidemia        Plan:       Cellulitis of right lower extremity    Mixed hyperlipidemia        Cellulitis resolved. Finish keflex as prescribed.    Hyperlipidemia: Low cholesterol diet and exercise at least 150 minutes/5 days per week. Eat lean protein such as chicken, turkey or fish. Limit red meat. Limit sugar and starchy foods. Avoid fried, greasy and fast food. Low fat or non-fat dairy only.   Repeat lipids in 6  mos.

## 2024-08-14 ENCOUNTER — PATIENT OUTREACH (OUTPATIENT)
Dept: ADMINISTRATIVE | Facility: HOSPITAL | Age: 59
End: 2024-08-14
Payer: OTHER GOVERNMENT

## 2024-08-14 NOTE — PROGRESS NOTES
Population Health Chart Review & Patient Outreach Details      Additional [facility] Notes:               Updates Requested / Reviewed:      Updated Care Coordination Note         Health Maintenance Topics Overdue:      VBHM Score: 3     Cervical Cancer Screening  Colon Cancer Screening  Mammogram    Tetanus Vaccine  Shingles/Zoster Vaccine  RSV Vaccine                  Health Maintenance Topic(s) Outreach Outcomes & Actions Taken:    Breast Cancer Screening - Outreach Outcomes & Actions Taken  : Mammogram Order Placed    Colorectal Cancer Screening - Outreach Outcomes & Actions Taken  : Reminded Patient to Complete Already Received Home Test    Cervical Cancer Screening - Outreach Outcomes & Actions Taken  : Referral ordered

## 2024-08-18 DIAGNOSIS — I10 UNCONTROLLED HYPERTENSION: ICD-10-CM

## 2024-08-18 RX ORDER — AMLODIPINE BESYLATE 5 MG/1
5 TABLET ORAL DAILY
Qty: 90 TABLET | Refills: 3 | Status: SHIPPED | OUTPATIENT
Start: 2024-08-18 | End: 2025-08-13

## 2024-09-13 ENCOUNTER — PATIENT MESSAGE (OUTPATIENT)
Dept: ADMINISTRATIVE | Facility: HOSPITAL | Age: 59
End: 2024-09-13
Payer: OTHER GOVERNMENT

## 2024-11-11 ENCOUNTER — OFFICE VISIT (OUTPATIENT)
Dept: FAMILY MEDICINE | Facility: CLINIC | Age: 59
End: 2024-11-11
Payer: OTHER GOVERNMENT

## 2024-11-11 VITALS
HEART RATE: 95 BPM | WEIGHT: 211.88 LBS | HEIGHT: 69 IN | DIASTOLIC BLOOD PRESSURE: 72 MMHG | OXYGEN SATURATION: 99 % | BODY MASS INDEX: 31.38 KG/M2 | SYSTOLIC BLOOD PRESSURE: 128 MMHG

## 2024-11-11 DIAGNOSIS — E66.811 CLASS 1 OBESITY WITH SERIOUS COMORBIDITY AND BODY MASS INDEX (BMI) OF 31.0 TO 31.9 IN ADULT, UNSPECIFIED OBESITY TYPE: ICD-10-CM

## 2024-11-11 DIAGNOSIS — R73.03 PREDIABETES: ICD-10-CM

## 2024-11-11 DIAGNOSIS — I10 PRIMARY HYPERTENSION: Primary | ICD-10-CM

## 2024-11-11 DIAGNOSIS — M10.9 GOUT, UNSPECIFIED CAUSE, UNSPECIFIED CHRONICITY, UNSPECIFIED SITE: ICD-10-CM

## 2024-11-11 DIAGNOSIS — E78.2 MIXED HYPERLIPIDEMIA: ICD-10-CM

## 2024-11-11 PROCEDURE — 99213 OFFICE O/P EST LOW 20 MIN: CPT | Mod: PBBFAC,PO | Performed by: NURSE PRACTITIONER

## 2024-11-11 PROCEDURE — 99214 OFFICE O/P EST MOD 30 MIN: CPT | Mod: S$PBB,,, | Performed by: NURSE PRACTITIONER

## 2024-11-11 PROCEDURE — 99999 PR PBB SHADOW E&M-EST. PATIENT-LVL III: CPT | Mod: PBBFAC,,, | Performed by: NURSE PRACTITIONER

## 2024-11-11 NOTE — PROGRESS NOTES
"Subjective     Patient ID: Roxanne Aquino is a 58 y.o. female.    Chief Complaint: 3 month f/u      HPI      Patient is new to me. PCP is Dr. Galindo.     57 y/o F with HTN, HLD, prediabetes presents to clinic for 3 mo check up.     HTN- on amlodipine 5mg and Losartan HCTZ 50-12.5mg daily.    Reviewed previous labs, prediabetic A1C was 6% in June.    Review of Systems   All other systems reviewed and are negative.         Objective   Vitals:    11/11/24 1337   BP: 128/72   Pulse: 95   SpO2: 99%   Weight: 96.1 kg (211 lb 13.8 oz)   Height: 5' 9" (1.753 m)      Physical Exam  Constitutional:       General: She is not in acute distress.     Appearance: Normal appearance. She is obese. She is not ill-appearing, toxic-appearing or diaphoretic.   HENT:      Head: Normocephalic and atraumatic.   Cardiovascular:      Rate and Rhythm: Normal rate and regular rhythm.      Heart sounds: Normal heart sounds. No murmur heard.     No friction rub. No gallop.   Pulmonary:      Effort: No respiratory distress.      Breath sounds: Normal breath sounds. No stridor. No wheezing, rhonchi or rales.   Chest:      Chest wall: No tenderness.   Musculoskeletal:      Right lower leg: No edema.      Left lower leg: No edema.   Neurological:      General: No focal deficit present.      Mental Status: She is alert and oriented to person, place, and time. Mental status is at baseline.   Psychiatric:         Mood and Affect: Mood normal.         Behavior: Behavior normal.         Thought Content: Thought content normal.         Judgment: Judgment normal.         1. Primary hypertension  - TSH; Future    2. Mixed hyperlipidemia  - Lipid Panel; Future  - TSH; Future    3. Prediabetes  - HEMOGLOBIN A1C; Future    4. Gout, unspecified cause, unspecified chronicity, unspecified site  - URIC ACID; Future  -check uric acid level, if elevated, will plan to stop hctz    5. Class 1 obesity with serious comorbidity and body mass index (BMI) of 31.0 to " 31.9 in adult, unspecified obesity type   Discussed weight loss, not interested in meds at this time.     RTC/ER precautions given. F/U for PAP smear.     Counseled on regular exercise, maintenance of a healthy weight, balanced diet rich in fruits/vegetables and lean protein, and avoidance of unhealthy habits like smoking and excessive alcohol intake.    Germaine Aguero, MURPHYP-C

## 2024-12-04 ENCOUNTER — OFFICE VISIT (OUTPATIENT)
Dept: FAMILY MEDICINE | Facility: CLINIC | Age: 59
End: 2024-12-04
Payer: OTHER GOVERNMENT

## 2024-12-04 ENCOUNTER — HOSPITAL ENCOUNTER (OUTPATIENT)
Dept: RADIOLOGY | Facility: HOSPITAL | Age: 59
Discharge: HOME | End: 2024-12-04
Attending: PHYSICIAN ASSISTANT
Payer: OTHER GOVERNMENT

## 2024-12-04 VITALS
WEIGHT: 212.75 LBS | SYSTOLIC BLOOD PRESSURE: 124 MMHG | DIASTOLIC BLOOD PRESSURE: 72 MMHG | HEIGHT: 69 IN | OXYGEN SATURATION: 95 % | BODY MASS INDEX: 31.51 KG/M2 | HEART RATE: 103 BPM

## 2024-12-04 DIAGNOSIS — B37.2 CANDIDAL INTERTRIGO: ICD-10-CM

## 2024-12-04 DIAGNOSIS — Z12.4 SCREENING FOR CERVICAL CANCER: Primary | ICD-10-CM

## 2024-12-04 DIAGNOSIS — Z12.31 ENCOUNTER FOR SCREENING MAMMOGRAM FOR MALIGNANT NEOPLASM OF BREAST: ICD-10-CM

## 2024-12-04 DIAGNOSIS — I10 PRIMARY HYPERTENSION: ICD-10-CM

## 2024-12-04 DIAGNOSIS — Z78.0 POST-MENOPAUSAL: ICD-10-CM

## 2024-12-04 PROCEDURE — 99999 PR PBB SHADOW E&M-EST. PATIENT-LVL III: CPT | Mod: PBBFAC,,, | Performed by: NURSE PRACTITIONER

## 2024-12-04 PROCEDURE — 99213 OFFICE O/P EST LOW 20 MIN: CPT | Mod: PBBFAC,PO | Performed by: NURSE PRACTITIONER

## 2024-12-04 PROCEDURE — 99213 OFFICE O/P EST LOW 20 MIN: CPT | Mod: S$PBB,,, | Performed by: NURSE PRACTITIONER

## 2024-12-04 PROCEDURE — 77063 BREAST TOMOSYNTHESIS BI: CPT | Mod: TC,PO

## 2024-12-04 PROCEDURE — 77063 BREAST TOMOSYNTHESIS BI: CPT | Mod: 26,,, | Performed by: RADIOLOGY

## 2024-12-04 PROCEDURE — 77067 SCR MAMMO BI INCL CAD: CPT | Mod: 26,,, | Performed by: RADIOLOGY

## 2024-12-04 RX ORDER — CLOTRIMAZOLE 1 %
CREAM (GRAM) TOPICAL 2 TIMES DAILY
Qty: 60 G | Refills: 0 | Status: SHIPPED | OUTPATIENT
Start: 2024-12-04

## 2024-12-04 RX ORDER — COLCHICINE 0.6 MG/1
TABLET ORAL
COMMUNITY
Start: 2024-07-24

## 2024-12-04 NOTE — PROGRESS NOTES
"Subjective     Patient ID: Roxanne Aquino is a 58 y.o. female.    Chief Complaint: pap      HPI      Patient is known to me. PCP is Dr. Galindo.     59 y/o F with HTN, gout presents to clinic for pap smear. Did labs today, results pending. Denies any complaints. Denies hx abnormal pap smears. She no longer has a menstrual cycle.     Review of Systems   All other systems reviewed and are negative.         Objective   Vitals:    12/04/24 1310   BP: 124/72   Pulse: 103   SpO2: 95%   Weight: 96.5 kg (212 lb 11.9 oz)   Height: 5' 9" (1.753 m)      Physical Exam  Exam conducted with a chaperone present.   Constitutional:       General: She is not in acute distress.     Appearance: Normal appearance. She is not ill-appearing, toxic-appearing or diaphoretic.   HENT:      Head: Normocephalic and atraumatic.   Pulmonary:      Effort: No respiratory distress.   Genitourinary:     Exam position: Lithotomy position.      Pubic Area: Rash (intertrigenous rash noted to perineal area and groin) present.      Labia:         Right: No tenderness, lesion or injury.         Left: No tenderness, lesion or injury.       Urethra: No prolapse, urethral pain, urethral swelling or urethral lesion.      Vagina: No signs of injury. No vaginal discharge or tenderness.      Cervix: Normal. No cervical motion tenderness, discharge, friability, lesion, erythema, cervical bleeding or eversion.      Uterus: Normal.       Adnexa: Right adnexa normal and left adnexa normal.   Neurological:      General: No focal deficit present.      Mental Status: She is alert and oriented to person, place, and time.   Psychiatric:         Mood and Affect: Mood normal.         Behavior: Behavior normal.         Thought Content: Thought content normal.         Judgment: Judgment normal.         1. Screening for cervical cancer  - Liquid-Based Pap Smear, Screening  - HPV High Risk Genotypes, PCR    2. Candidal intertrigo  - clotrimazole (LOTRIMIN) 1 % cream; Apply " topically 2 (two) times daily.  Dispense: 60 g; Refill: 0    3. Primary hypertension  Stable, controlled on Amlodipine, losartan/hctz    4. Post-menopausal       RTC/ER precautions given. F/U 6 mo.     Counseled on regular exercise, maintenance of a healthy weight, balanced diet rich in fruits/vegetables and lean protein, and avoidance of unhealthy habits like smoking and excessive alcohol intake.    Germaine Aguero, VINAY-C

## 2024-12-26 ENCOUNTER — LAB VISIT (OUTPATIENT)
Dept: LAB | Facility: HOSPITAL | Age: 59
End: 2024-12-26
Payer: OTHER GOVERNMENT

## 2024-12-26 DIAGNOSIS — Z00.00 WELLNESS EXAMINATION: ICD-10-CM

## 2024-12-26 LAB — HEMOCCULT STL QL IA: NEGATIVE

## 2024-12-26 PROCEDURE — 82274 ASSAY TEST FOR BLOOD FECAL: CPT | Performed by: FAMILY MEDICINE

## 2025-03-01 NOTE — TELEPHONE ENCOUNTER
Please call patient and ask her schedule her follow-up with me sometime within the next 2-3 weeks or 1st available; she will also need to schedule her labs which have been ordered from previous visit.  These need to be obtained after an overnight fast and need to be performed before the visit she may need to get on a waiting list as well.   dependent (less than 25% patients effort) maximum assist (25% patients effort)

## 2025-05-30 ENCOUNTER — TELEPHONE (OUTPATIENT)
Dept: FAMILY MEDICINE | Facility: CLINIC | Age: 60
End: 2025-05-30

## 2025-05-30 NOTE — TELEPHONE ENCOUNTER
Vickm for pt. Requesting she call office for assistance rescheduling appt. With ELIDA Aguero NP, on 6/4/25.

## 2025-07-02 DIAGNOSIS — I10 UNCONTROLLED HYPERTENSION: ICD-10-CM

## 2025-07-02 RX ORDER — LOSARTAN POTASSIUM AND HYDROCHLOROTHIAZIDE 12.5; 5 MG/1; MG/1
1 TABLET ORAL DAILY
Qty: 60 TABLET | Refills: 0 | OUTPATIENT
Start: 2025-07-02 | End: 2026-07-02

## 2025-07-02 RX ORDER — LOSARTAN POTASSIUM AND HYDROCHLOROTHIAZIDE 12.5; 5 MG/1; MG/1
1 TABLET ORAL DAILY
Qty: 90 TABLET | Refills: 3 | Status: SHIPPED | OUTPATIENT
Start: 2025-07-02 | End: 2026-07-02

## 2025-07-02 NOTE — TELEPHONE ENCOUNTER
Refill Routing Note   Medication(s) are not appropriate for processing by [facility] for the following reason(s):        Required labs outdated    [facility] action(s):  Defer   Requires appointment : Yes   Requires labs : Yes             Appointments  past 12m or future 3m with PCP    Date Provider   Last Visit   6/27/2024 Joseph Galindo MD   Next Visit   7/2/2025 Joseph Galindo MD   ED visits in past 90 days: 0        Note composed:11:16 AM 07/02/2025

## 2025-07-02 NOTE — TELEPHONE ENCOUNTER
Refill Decision Note   Roxanne Aquino  is requesting a refill authorization.  Brief Assessment and Rationale for Refill:  Quick Discontinue     Medication Therapy Plan: DUPLICATE      Comments:     Note composed:11:08 AM 07/02/2025

## 2025-07-02 NOTE — TELEPHONE ENCOUNTER
Care Due:                  Date            Visit Type   Department     Provider  --------------------------------------------------------------------------------                                SAME DAY -                              ESTABLISHED   [facility] FAMILY  Last Visit: 06-      PATIENT      MEDICINE       Joseph Galindo  Next Visit: None Scheduled  None         None Found                                                            Last  Test          Frequency    Reason                     Performed    Due Date  --------------------------------------------------------------------------------    Office Visit  15 months..  amLODIPine,                06- 09-                             losartan-hydrochlorothiaz                             anthony, rosuvastatin........    CMP.........  12 months..  losartan-hydrochlorothiaz  06- 06-                             anthony, rosuvastatin........    [employer] Embedded Care Due Messages. Reference number: [id number].   7/02/2025 10:07:34 AM CDT

## 2025-07-02 NOTE — TELEPHONE ENCOUNTER
No care due was identified.  [facility] Embedded Care Due Messages. Reference number: [id number].   7/02/2025 10:44:40 AM CDT

## 2025-07-09 ENCOUNTER — LAB VISIT (OUTPATIENT)
Dept: LAB | Facility: HOSPITAL | Age: 60
End: 2025-07-09
Attending: INTERNAL MEDICINE
Payer: OTHER GOVERNMENT

## 2025-07-09 ENCOUNTER — OFFICE VISIT (OUTPATIENT)
Dept: FAMILY MEDICINE | Facility: CLINIC | Age: 60
End: 2025-07-09
Payer: OTHER GOVERNMENT

## 2025-07-09 VITALS
SYSTOLIC BLOOD PRESSURE: 124 MMHG | HEART RATE: 96 BPM | DIASTOLIC BLOOD PRESSURE: 70 MMHG | WEIGHT: 212.5 LBS | RESPIRATION RATE: 19 BRPM | HEIGHT: 69 IN | OXYGEN SATURATION: 98 % | BODY MASS INDEX: 31.47 KG/M2

## 2025-07-09 DIAGNOSIS — Z00.00 ANNUAL PHYSICAL EXAM: ICD-10-CM

## 2025-07-09 DIAGNOSIS — Z00.00 ANNUAL PHYSICAL EXAM: Primary | ICD-10-CM

## 2025-07-09 DIAGNOSIS — I10 UNCONTROLLED HYPERTENSION: ICD-10-CM

## 2025-07-09 LAB
25(OH)D3+25(OH)D2 SERPL-MCNC: 13 NG/ML (ref 30–96)
ABSOLUTE EOSINOPHIL (OHS): 0.45 K/UL
ABSOLUTE MONOCYTE (OHS): 0.64 K/UL (ref 0.3–1)
ABSOLUTE NEUTROPHIL COUNT (OHS): 3.37 K/UL (ref 1.8–7.7)
ALBUMIN SERPL BCP-MCNC: 4 G/DL (ref 3.5–5.2)
ALP SERPL-CCNC: 174 UNIT/L (ref 40–150)
ALT SERPL W/O P-5'-P-CCNC: 52 UNIT/L (ref 10–44)
ANION GAP (OHS): 9 MMOL/L (ref 8–16)
AST SERPL-CCNC: 47 UNIT/L (ref 11–45)
BASOPHILS # BLD AUTO: 0.08 K/UL
BASOPHILS NFR BLD AUTO: 1.1 %
BILIRUB SERPL-MCNC: 0.6 MG/DL (ref 0.1–1)
BUN SERPL-MCNC: 17 MG/DL (ref 8–23)
CALCIUM SERPL-MCNC: 9.9 MG/DL (ref 8.7–10.5)
CHLORIDE SERPL-SCNC: 104 MMOL/L (ref 95–110)
CHOLEST SERPL-MCNC: 229 MG/DL (ref 120–199)
CHOLEST/HDLC SERPL: 5 {RATIO} (ref 2–5)
CO2 SERPL-SCNC: 25 MMOL/L (ref 23–29)
CREAT SERPL-MCNC: 1.2 MG/DL (ref 0.5–1.4)
EAG (OHS): 131 MG/DL (ref 68–131)
ERYTHROCYTE [DISTWIDTH] IN BLOOD BY AUTOMATED COUNT: 13 % (ref 11.5–14.5)
GFR SERPLBLD CREATININE-BSD FMLA CKD-EPI: 52 ML/MIN/1.73/M2
GLUCOSE SERPL-MCNC: 127 MG/DL (ref 70–110)
HBA1C MFR BLD: 6.2 % (ref 4–5.6)
HCT VFR BLD AUTO: 44.6 % (ref 37–48.5)
HDLC SERPL-MCNC: 46 MG/DL (ref 40–75)
HDLC SERPL: 20.1 % (ref 20–50)
HGB BLD-MCNC: 14.8 GM/DL (ref 12–16)
IMM GRANULOCYTES # BLD AUTO: 0.02 K/UL (ref 0–0.04)
IMM GRANULOCYTES NFR BLD AUTO: 0.3 % (ref 0–0.5)
LDLC SERPL CALC-MCNC: 151.6 MG/DL (ref 63–159)
LYMPHOCYTES # BLD AUTO: 2.55 K/UL (ref 1–4.8)
MCH RBC QN AUTO: 30.6 PG (ref 27–31)
MCHC RBC AUTO-ENTMCNC: 33.2 G/DL (ref 32–36)
MCV RBC AUTO: 92 FL (ref 82–98)
NONHDLC SERPL-MCNC: 183 MG/DL
NUCLEATED RBC (/100WBC) (OHS): 0 /100 WBC
PLATELET # BLD AUTO: 301 K/UL (ref 150–450)
PMV BLD AUTO: 11.3 FL (ref 9.2–12.9)
POTASSIUM SERPL-SCNC: 4.4 MMOL/L (ref 3.5–5.1)
PROT SERPL-MCNC: 7.9 GM/DL (ref 6–8.4)
RBC # BLD AUTO: 4.83 M/UL (ref 4–5.4)
RELATIVE EOSINOPHIL (OHS): 6.3 %
RELATIVE LYMPHOCYTE (OHS): 35.9 % (ref 18–48)
RELATIVE MONOCYTE (OHS): 9 % (ref 4–15)
RELATIVE NEUTROPHIL (OHS): 47.4 % (ref 38–73)
SODIUM SERPL-SCNC: 138 MMOL/L (ref 136–145)
TRIGL SERPL-MCNC: 157 MG/DL (ref 30–150)
TSH SERPL-ACNC: 1.2 UIU/ML (ref 0.4–4)
WBC # BLD AUTO: 7.11 K/UL (ref 3.9–12.7)

## 2025-07-09 PROCEDURE — 80053 COMPREHEN METABOLIC PANEL: CPT

## 2025-07-09 PROCEDURE — 99396 PREV VISIT EST AGE 40-64: CPT | Mod: S$PBB,,, | Performed by: INTERNAL MEDICINE

## 2025-07-09 PROCEDURE — 99999 PR PBB SHADOW E&M-EST. PATIENT-LVL III: CPT | Mod: PBBFAC,,, | Performed by: INTERNAL MEDICINE

## 2025-07-09 PROCEDURE — 84443 ASSAY THYROID STIM HORMONE: CPT

## 2025-07-09 PROCEDURE — 83695 ASSAY OF LIPOPROTEIN(A): CPT

## 2025-07-09 PROCEDURE — 85025 COMPLETE CBC W/AUTO DIFF WBC: CPT

## 2025-07-09 PROCEDURE — 99213 OFFICE O/P EST LOW 20 MIN: CPT | Mod: PBBFAC,PO | Performed by: INTERNAL MEDICINE

## 2025-07-09 PROCEDURE — 83036 HEMOGLOBIN GLYCOSYLATED A1C: CPT

## 2025-07-09 PROCEDURE — 82306 VITAMIN D 25 HYDROXY: CPT

## 2025-07-09 PROCEDURE — 80061 LIPID PANEL: CPT

## 2025-07-09 PROCEDURE — 36415 COLL VENOUS BLD VENIPUNCTURE: CPT | Mod: PO

## 2025-07-09 NOTE — PROGRESS NOTES
Subjective     Patient ID: Roxanne Aquino is a 59 y.o. female.    Chief Complaint: Annual Exam (Annual exam)    Here for annual.d ue labs.  Utd mmg, gyn exam and cscope.       Review of Systems   Constitutional:  Negative for fever.   Respiratory:  Negative for shortness of breath.    Cardiovascular:  Negative for chest pain.   Neurological:  Negative for headaches.          Objective     Physical Exam  Constitutional:       Appearance: Normal appearance.   HENT:      Head: Normocephalic.   Cardiovascular:      Rate and Rhythm: Normal rate and regular rhythm.   Pulmonary:      Effort: Pulmonary effort is normal.      Breath sounds: Normal breath sounds.   Musculoskeletal:         General: Normal range of motion.      Cervical back: Normal range of motion.   Neurological:      General: No focal deficit present.      Mental Status: She is alert.   Psychiatric:         Mood and Affect: Mood normal.         Behavior: Behavior normal.            Assessment and Plan     1. Annual physical exam  -     CBC Auto Differential; Future; Expected date: 07/09/2025  -     Lipid Panel; Future; Expected date: 07/09/2025  -     Comprehensive Metabolic Panel; Future; Expected date: 07/09/2025  -     TSH; Future; Expected date: 07/09/2025  -     Uric Acid; Future; Expected date: 07/09/2025  -     Hemoglobin A1C; Future; Expected date: 07/09/2025  -     Lipoprotein A (LPA); Future; Expected date: 07/09/2025  -     Vitamin D; Future; Expected date: 07/09/2025        Annual check labs . Rec shingrix          No follow-ups on file.

## 2025-07-10 ENCOUNTER — RESULTS FOLLOW-UP (OUTPATIENT)
Dept: FAMILY MEDICINE | Facility: CLINIC | Age: 60
End: 2025-07-10
Payer: OTHER GOVERNMENT

## 2025-07-10 DIAGNOSIS — R74.8 ELEVATED LIVER ENZYMES: Primary | ICD-10-CM

## 2025-07-14 ENCOUNTER — HOSPITAL ENCOUNTER (OUTPATIENT)
Dept: RADIOLOGY | Facility: HOSPITAL | Age: 60
Discharge: HOME | End: 2025-07-14
Attending: INTERNAL MEDICINE
Payer: OTHER GOVERNMENT

## 2025-07-14 DIAGNOSIS — R74.8 ELEVATED LIVER ENZYMES: ICD-10-CM

## 2025-07-14 PROCEDURE — 76705 ECHO EXAM OF ABDOMEN: CPT | Mod: TC,PO

## 2025-07-14 PROCEDURE — 76705 ECHO EXAM OF ABDOMEN: CPT | Mod: 26,,, | Performed by: RADIOLOGY

## 2025-07-15 LAB — W LP(A): 6 NMOL/L

## 2025-08-22 DIAGNOSIS — I10 UNCONTROLLED HYPERTENSION: ICD-10-CM

## 2025-08-25 RX ORDER — AMLODIPINE BESYLATE 5 MG/1
5 TABLET ORAL
Qty: 90 TABLET | Refills: 0 | Status: SHIPPED | OUTPATIENT
Start: 2025-08-25